# Patient Record
Sex: MALE | Race: WHITE
[De-identification: names, ages, dates, MRNs, and addresses within clinical notes are randomized per-mention and may not be internally consistent; named-entity substitution may affect disease eponyms.]

---

## 2020-12-14 ENCOUNTER — HOSPITAL ENCOUNTER (OUTPATIENT)
Dept: HOSPITAL 35 - LAB | Age: 75
End: 2020-12-14
Attending: SPECIALIST
Payer: COMMERCIAL

## 2020-12-14 DIAGNOSIS — Z20.828: ICD-10-CM

## 2020-12-14 DIAGNOSIS — Z01.812: Primary | ICD-10-CM

## 2020-12-18 ENCOUNTER — HOSPITAL ENCOUNTER (OUTPATIENT)
Dept: HOSPITAL 35 - GI | Age: 75
Discharge: HOME | End: 2020-12-18
Attending: SPECIALIST
Payer: COMMERCIAL

## 2020-12-18 VITALS — HEIGHT: 65.98 IN | WEIGHT: 139 LBS | BODY MASS INDEX: 22.34 KG/M2

## 2020-12-18 DIAGNOSIS — Z98.890: ICD-10-CM

## 2020-12-18 DIAGNOSIS — E11.9: ICD-10-CM

## 2020-12-18 DIAGNOSIS — I10: ICD-10-CM

## 2020-12-18 DIAGNOSIS — Z79.899: ICD-10-CM

## 2020-12-18 DIAGNOSIS — Z98.41: ICD-10-CM

## 2020-12-18 DIAGNOSIS — Z87.891: ICD-10-CM

## 2020-12-18 DIAGNOSIS — K64.4: ICD-10-CM

## 2020-12-18 DIAGNOSIS — Z85.828: ICD-10-CM

## 2020-12-18 DIAGNOSIS — K57.30: ICD-10-CM

## 2020-12-18 DIAGNOSIS — Z12.11: Primary | ICD-10-CM

## 2020-12-18 DIAGNOSIS — Z98.42: ICD-10-CM

## 2020-12-18 DIAGNOSIS — D12.4: ICD-10-CM

## 2020-12-18 DIAGNOSIS — E78.00: ICD-10-CM

## 2020-12-18 PROCEDURE — 62900: CPT

## 2020-12-18 PROCEDURE — 62110: CPT

## 2020-12-19 NOTE — P
[facility]
[address]
[place]   [address]                     PROCEDURE REPORT              
_______________________________________________________________________________
 
Name:       KENDRICK VINES                Room #:                     REG [facility] 
CHUCK#:      [id number]                       Account #:      [id number]  
Admission:  12/18/20    Attend Phys:    [place]
Discharge:              Date of Birth:  [birth year]  
                                                          Report #: [id number]
                                                                    [id number]   
_______________________________________________________________________________
THIS REPORT FOR:  
 
cc:  Carmita Lancaster MD, Sara A. MD McElhinney, Christian C. MD                                   ~
 
DATE OF SERVICE:  12/18/2020
 
 
PROCEDURE PERFORMED:  Colonoscopy with polypectomies.
 
HISTORY OF PRESENT ILLNESS:  The patient is a 75-year-old male who presents
today for screening colonoscopy.  Last colonoscopy approximately 10 years ago. 
He denies any symptoms.  No family history of colon cancer.
 
DESCRIPTION OF PROCEDURE:  The risks and benefits of the procedure were
explained to the patient, those risks including but not limited to bleeding,
perforation and the risk of sedation.  He understood these risks and gave
informed consent.  Sedation was given using propofol per anesthesia.  Next, a
digital rectal exam was initially performed, which was normal.  Next, using a
standard Olympus colonoscope, the scope was placed in the patient's anus and
advanced under direct vision to the cecum.  The overall prep was good.  The
cecum and ileocecal valve were normal in appearance.  The ascending colon was
normal.  In the transverse colon, a 6 mm sessile polyp was noted.  This was
removed by snare cautery, otherwise normal.  In the descending colon, another 5
mm sessile polyp was noted, also removed by snare cautery.  Multiple diverticula
were noted in the sigmoid colon, no evidence of inflammation, otherwise normal. 
The rectal mucosa was normal.  On retroflexion, no abnormalities were noted. 
Small external hemorrhoid was noted.  The scope was then withdrawn and the
procedure terminated.  The patient tolerated the procedure well.
 
IMPRESSION:
1.  Two colonic polyps.
2.  Sigmoid diverticulosis.
3.  External hemorrhoids.
4.  Otherwise, normal colonoscopy.
 
RECOMMENDATIONS:
1.  Await biopsy results.
2.  Repeat colonoscopy in 5 years.
 
 
 
[address]   [address]                     PROCEDURE REPORT              
_______________________________________________________________________________
 
Name:       KENDRICK VINES                Room #:                     REG CLALICE WOODS#:      [id number]                       Account #:      [id number]  
Admission:  12/18/20    Attend Phys:    [place]
Discharge:              Date of Birth:  [birth year]  
                                                          Report #: [id number]
                                                                    [id number]   
_______________________________________________________________________________
 
 
Thank you for allowing me to participate in his care.
 
 
 
 
 
 
 
 
 
 
 
 
 
 
 
 
 
 
 
 
 
 
 
 
 
 
 
 
 
 
 
 
 
 
 
 
 
 
 
 
 
  <ELECTRONICALLY SIGNED>
   By: North Landry MD   
  12/19/20     1050
D: 12/18/20 0932                           _____________________________________
T: 12/18/20 0939                           North Landry MD     /nt

## 2020-12-22 NOTE — PATH
[facility]
[address]
[place]   [address]                     PATHOLOGY RPT PROCEDURE       
_______________________________________________________________________________
 
Name:       KENDRICK VINES FLAQUITA                Room #:                     REG BANDAR WOODS#:      [id number]     Account #:      [id number]  
Admission:  12/18/20    Date of Birth:  [birth year]  
Discharge:                                              Report #:    [id number]
                                                        Path Case #: [id number]
_______________________________________________________________________________
 
[facility] Accession Number: [id number]
.                                                                01
Material submitted:                                        .
PART A: colon - TRANSVERSE COLON POLYP BIOPSY. Modifiers: transverse
PART B: colon - DESCENDING COLON POLYP BIOPSY. Modifiers: descending
.                                                                01
Clinician provided ICD-10:
y
.                                                                01
Clinical history:                                          .
SCREENING, COLON CANCER, DIVERTICULOSIS, POLYPS
.                                                                02
**********************************************************************
Diagnosis:
A.  Polyp, transverse colon polyp, endoscopic biopsy:
- Hyperplastic polyp.
- Negative for dysplasia.
.
B.  Polyp, descending colon polyp, endoscopic biopsy:
- Tubular adenoma.
- Negative for high-grade dysplasia.
.
(IUV:; 12/22/2020)
MBR  12/22/2020  1332 Local
**********************************************************************
.                                                                02
Electronically signed:                                     .
Hiral Aviles MD, Pathologist
NPI- [id number]
.                                                                01
Gross description:                                         .
A.  The specimen is received in formalin, labeled "Kendrick Vines,
transverse colon polyp biopsy".  Received is a segment of pale tan soft
tissue measuring 0.4 cm in maximum dimensions.  The specimen is submitted
entirely in cassette A1.
.
B.  The specimen is received in formalin, labeled "Kendrick Vines,
descending colon polyp biopsy".  Received is a segment of pale tan soft
tissue measuring 0.7 cm in maximum dimensions.  The specimen is submitted
entirely in cassette B1.
(CAA; 12/21/2020)
QAC/QAC  12/21/2020  1048 Local
.                                                                02
Pathologist provided ICD-10:
K63.5, D12.4
.                                                                02
CPT                                                        .
 
 
[address]   [address]                     PATHOLOGY RPT PROCEDURE       
_______________________________________________________________________________
 
Name:       KENDRICK VINES                Room #:                     REG CL 
CHUCK#:      [id number]     Account #:      [id number]  
Admission:  12/18/20    Date of Birth:  [birth year]  
Discharge:                                              Report #:    [id number]
                                                        Path Case #: [id number]
_______________________________________________________________________________
[id number]
Specimen Comment: A courtesy copy of this report has been sent to [phone]
[phone]
Specimen Comment: [id number]
Specimen Comment: Report sent to  / DR CEBALLOS
***Performed at:  01
   [address], [place], [place]  [id number]
   MD Jad Choudhury MD Phone:  [phone]
***Performed at:  02
   [address]  [id number]
   MD Hiral Aviles MD Phone:  [phone]